# Patient Record
Sex: MALE | Race: OTHER | HISPANIC OR LATINO | URBAN - METROPOLITAN AREA
[De-identification: names, ages, dates, MRNs, and addresses within clinical notes are randomized per-mention and may not be internally consistent; named-entity substitution may affect disease eponyms.]

---

## 2017-02-03 ENCOUNTER — EMERGENCY (EMERGENCY)
Facility: HOSPITAL | Age: 47
LOS: 1 days | Discharge: HOME | End: 2017-02-03
Attending: EMERGENCY MEDICINE
Payer: SELF-PAY

## 2017-02-03 VITALS
DIASTOLIC BLOOD PRESSURE: 79 MMHG | SYSTOLIC BLOOD PRESSURE: 125 MMHG | OXYGEN SATURATION: 99 % | RESPIRATION RATE: 18 BRPM | TEMPERATURE: 98 F | HEIGHT: 66 IN | HEART RATE: 66 BPM | WEIGHT: 149.91 LBS

## 2017-02-03 DIAGNOSIS — M54.5 LOW BACK PAIN: ICD-10-CM

## 2017-02-03 PROCEDURE — 96372 THER/PROPH/DIAG INJ SC/IM: CPT

## 2017-02-03 PROCEDURE — 99284 EMERGENCY DEPT VISIT MOD MDM: CPT | Mod: 25

## 2017-02-03 PROCEDURE — 99053 MED SERV 10PM-8AM 24 HR FAC: CPT

## 2017-02-03 PROCEDURE — 99283 EMERGENCY DEPT VISIT LOW MDM: CPT | Mod: 25

## 2017-02-03 RX ORDER — MORPHINE SULFATE 50 MG/1
4 CAPSULE, EXTENDED RELEASE ORAL ONCE
Qty: 0 | Refills: 0 | Status: DISCONTINUED | OUTPATIENT
Start: 2017-02-03 | End: 2017-02-03

## 2017-02-03 RX ORDER — DIAZEPAM 5 MG
2 TABLET ORAL ONCE
Qty: 0 | Refills: 0 | Status: DISCONTINUED | OUTPATIENT
Start: 2017-02-03 | End: 2017-02-03

## 2017-02-03 RX ORDER — KETOROLAC TROMETHAMINE 30 MG/ML
30 SYRINGE (ML) INJECTION ONCE
Qty: 0 | Refills: 0 | Status: DISCONTINUED | OUTPATIENT
Start: 2017-02-03 | End: 2017-02-03

## 2017-02-03 RX ORDER — MELOXICAM 15 MG/1
2 TABLET ORAL
Qty: 14 | Refills: 0 | OUTPATIENT
Start: 2017-02-03 | End: 2017-02-10

## 2017-02-03 RX ADMIN — Medication 2 MILLIGRAM(S): at 08:19

## 2017-02-03 RX ADMIN — Medication 30 MILLIGRAM(S): at 09:09

## 2017-02-03 RX ADMIN — MORPHINE SULFATE 4 MILLIGRAM(S): 50 CAPSULE, EXTENDED RELEASE ORAL at 09:09

## 2017-02-03 RX ADMIN — Medication 30 MILLIGRAM(S): at 08:18

## 2017-02-03 RX ADMIN — MORPHINE SULFATE 4 MILLIGRAM(S): 50 CAPSULE, EXTENDED RELEASE ORAL at 08:19

## 2017-02-03 NOTE — ED PROVIDER NOTE - MEDICAL DECISION MAKING DETAILS
47 y/o M pt w/ lower back pain s/p lifting heavy. Will give pain control and reassess. Likely d/c home. 45 y/o M pt w/ lower back pain s/p lifting heavy. Will give pain control and reassess. Likely d/c home. After careful review of history, physical, and supporting data the patient has a low likelihood for a life threatening etiology of back pain. No history of constitutional symptoms, injection drug use, cancer or immunocompromised status. The patient is afebrile, has an unremarkable neuro exam, no bladder or bowel dysfunction, or evidence of saddle anesthesia. 47 y/o M pt w/ lower back pain s/p lifting heavy. Will give pain control and reassess. Likely d/c home. After careful review of history, physical, and supporting data the patient has a low likelihood for a life threatening etiology of back pain. No history of constitutional symptoms, injection drug use, cancer or immunocompromised status. The patient is afebrile, has an unremarkable neuro exam, no bladder or bowel dysfunction, or evidence of saddle anesthesia. Pt is well appearing, stable for discharge and follow up without fail with medical doctor. I had a detailed discussion with the patient and/or guardian regarding the historical points, exam findings, and any diagnostic results supporting the discharge diagnosis. Pt educated on care and need for follow up. Strict return instructions and red flag signs and symptoms discussed with patient. Medications for discharge discussed and adverse effects reviewed. Questions answered. Pt shows understanding of discharge information and agrees to follow.

## 2017-02-03 NOTE — ED PROVIDER NOTE - PROGRESS NOTE DETAILS
pain improving. more mobile. discussed likely projected course and recovery. will give meds for continued pain control. pcp f/u within 72 hours

## 2017-02-03 NOTE — ED PROVIDER NOTE - NS ED MD SCRIBE ATTENDING SCRIBE SECTIONS
HISTORY OF PRESENT ILLNESS/REVIEW OF SYSTEMS/PAST MEDICAL/SURGICAL/SOCIAL HISTORY/VITAL SIGNS( Pullset)/HIV/DISPOSITION/PHYSICAL EXAM

## 2017-02-03 NOTE — ED PROVIDER NOTE - CHPI ED SYMPTOMS NEG
no fever, no chills, no dysuria, no hematuria, no  flank pain, no change in urinary/bowel function, no  numbness, no  tingling, no focal weakness, no abdominal pain, no N/V/D/C, no melena, no CP, no SOB, no  palpitations, no diaphoresis, no dizziness, no HA, no cough, no rash

## 2017-02-03 NOTE — ED PROVIDER NOTE - OBJECTIVE STATEMENT
45 y/o M pt w/ no significant PMHx presents to the ED c/o lower back pain onset yesterday. Pt reports lower back pain radiating to the neck. Pt reports he was lifting something heavy prior to the sx. Pt took 1 Motrin to no relief. Pt denies IV drug use, or smoking. Pt denies fever, chills, dysuria, hematuria, flank pain, change in urinary/bowel function, numbness, tingling, focal weakness, abdominal pain, N/V/D/C, melena, CP, SOB, palpitations, diaphoresis, dizziness, HA, cough, rash, trauma, and fall. NKDA.

## 2017-02-03 NOTE — ED PROVIDER NOTE - PHYSICAL EXAMINATION
Alert and oriented, no apparent distress, normocephalic, atraumatic head, breathing comfortably, equal chest excursion, moving all extremities, no bony tenderness, no paraspinal tenderness, slow w/ movement secondary to pain, no evidence of intoxication, no focal neurologic defecits, no active psychiatric issues

## 2017-02-03 NOTE — ED ADULT NURSE NOTE - OBJECTIVE STATEMENT
AOX3 +ambulatory as per  patient complaining of lower back pain s/p lifting something heavy yesterday. patient denies any numbness or tingling no GI  problems
